# Patient Record
Sex: FEMALE | Race: WHITE
[De-identification: names, ages, dates, MRNs, and addresses within clinical notes are randomized per-mention and may not be internally consistent; named-entity substitution may affect disease eponyms.]

---

## 2017-09-29 ENCOUNTER — HOSPITAL ENCOUNTER (OUTPATIENT)
Dept: HOSPITAL 80 - BMCIMAGING | Age: 37
End: 2017-09-29
Attending: INTERNAL MEDICINE
Payer: MEDICAID

## 2017-09-29 DIAGNOSIS — T83.32XA: ICD-10-CM

## 2017-09-29 DIAGNOSIS — N83.02: ICD-10-CM

## 2017-09-29 DIAGNOSIS — N83.01: Primary | ICD-10-CM

## 2017-11-04 ENCOUNTER — HOSPITAL ENCOUNTER (EMERGENCY)
Dept: HOSPITAL 80 - FED | Age: 37
Discharge: HOME | End: 2017-11-04
Payer: MEDICAID

## 2017-11-04 VITALS
HEART RATE: 103 BPM | DIASTOLIC BLOOD PRESSURE: 76 MMHG | SYSTOLIC BLOOD PRESSURE: 130 MMHG | RESPIRATION RATE: 14 BRPM | OXYGEN SATURATION: 96 %

## 2017-11-04 VITALS — TEMPERATURE: 99 F

## 2017-11-04 DIAGNOSIS — B27.80: Primary | ICD-10-CM

## 2017-11-04 DIAGNOSIS — E86.9: ICD-10-CM

## 2017-11-04 LAB — PLATELET # BLD: 265 10^3/UL (ref 150–400)

## 2017-11-04 PROCEDURE — 3E0337Z INTRODUCTION OF ELECTROLYTIC AND WATER BALANCE SUBSTANCE INTO PERIPHERAL VEIN, PERCUTANEOUS APPROACH: ICD-10-PCS | Performed by: EMERGENCY MEDICINE

## 2017-11-04 NOTE — EDPHY
H & P


Stated Complaint: Sore throat;neg strep at [facility] care;airway patent


Time Seen by Provider: 11/04/17 18:23


HPI/ROS: 


HPI:  This is a 37-year-old female who presents with





Chief Complaint: Sore throat;neg strep at [facility];airway patent





Location:  Throat


Quality:  Sore


Duration:  2 days


Signs and Symptoms:  No fever, no cough, no neck stiffness, + right swollen 

glands, 


Timing:  Rapidly worsening


Severity:  Severe


Context:  Patient presents from the urgent care for concerns of sore throat 

that she has had for the last 2 days.  She tested negative 1st rapid strep at 

the urgent care.  She reports throat pain is constant and severe in nature, 

with difficulty swallowing decreased oral intake today.  She also complains of 

swollen right gland that is severe and constant pain.  She has a history of 

strep pharyngitis and peritonsillar abscess several years ago that required 

drainage and antibiotics.  She has taken Tylenol p.m. last night with mild 

relief of discomfort. 


Modifying Factors:  See above





Comment: 








ROS: see HPI


Constitutional: No fever, no chills, no weight loss


Eyes:  No blurred vision


Respiratory:  No shortness of breath, no cough


Cardiovascular:  No chest pain


Gastrointestinal:  No nausea, no vomiting, no diarrhea 


Genitourinary:  No dysuria 


Extremities:  No myalgias 


Neurologic:  No weakness, no numbness


Skin:  No rashes


Hematologic:  No bruising, no bleeding





MEDICAL/SURGICAL/SOCIAL HISTORY: 


Medical history:  Generally healthy.  Does not take any regular medications.


Surgical history:  Denies


Social history:  Employed








CONSTITUTIONAL: awake and alert, no obvious distress


HEENT: Atraumatic and normocephalic, PERRL, EOMI. Tympanic membranes clear. 

Oropharynx clear, tonsil on the right is 2+ mild erythema with white exudate; 

left tonsils 1+ mild erythema; white exudate; uvula midline.  Halitosis noted. 

moist pink mucosa.  Airway patent.  Right anterior cervical lymphadenopathy 

noted with tenderness to palpation.  No meningismus.


Cardiovascular: Normal S1/S2, regular rate, regular rhythm, without murmur rub 

or gallop.


PULMONARY/CHEST:  Symmetrical and nontender. Clear to auscultation bilaterally. 

Good air movement. No accessory muscle usage.


ABDOMEN:  Soft, nondistended, nontender, no rebound, no guarding, no peritoneal 

signs, no masses or organomegaly. No CVAT.


EXTREMITIES:  2/2 pulses, strength 5/5, no deformities, no clubbing, no 

cyanosis or edema.


NEUROLOGICAL: no focal neuro deficits.  GCS 15.


SKIN: Warm and dry, no erythema. no rash.  Good capillary refill. 








Source: Patient


Exam Limitations: No limitations





- Personal History


LMP (Females 10-55): 8-14 Days Ago


Current Tetanus Diphtheria and Acellular Pertussis (TDAP): Yes





- Medical/Surgical History


Hx Asthma: No


Hx Chronic Respiratory Disease: No


Hx Diabetes: No


Hx Cardiac Disease: No


Hx Renal Disease: No


Hx Cirrhosis: No


Hx Alcoholism: No


Hx HIV/AIDS: No


Hx Splenectomy or Spleen Trauma: No





- Social History


Smoking Status: Never smoked


Constitutional: 


 Initial Vital Signs











Temperature (C)  37.2 C   11/04/17 17:56


 


Heart Rate  88   11/04/17 17:56


 


Respiratory Rate  16   11/04/17 17:56


 


Blood Pressure  123/84 H  11/04/17 17:56


 


O2 Sat (%)  98   11/04/17 17:56








 











O2 Delivery Mode               Room Air














Allergies/Adverse Reactions: 


 





No Known Allergies Allergy (Unverified 11/04/17 17:56)


 








Home Medications: 














 Medication  Instructions  Recorded


 


NK [No Known Home Meds]  11/04/17














Medical Decision Making


ED Course/Re-evaluation: 


Labs, IV fluid, IV medications ordered


Patient is afebrile without systemic signs. 


Martino criteria is 2; equivocal for antibiotic use


Rapid strep was negative at the urgent care and sent for culture; will not 

repeat test. 


Patient does not have signs of airway compromise/respiratory distress/meningitis

/sepsis/tonsillar abscess/Matthew's angina


Given IVF 1 liter, IV Decadron, IV Toradol and PO viscous lidocaine


IM Bicillin given due to hx strep and tonsillar abscess. 


Mono positive


Reassessed patient who is laughing and giggling and cuddling with her 

significant other in the rod bed. 


Advised supportive care. 





Differential Diagnosis: 


Differential diagnosis includes but is not limited to strep pharyngitis, viral 

pharyngitis, lymphadenitis, mononucleosis, viral syndrome. 








- Data Points


Laboratory Results: 


 Laboratory Results





 11/04/17 18:55 





 11/04/17 18:55 





 











  11/04/17 11/04/17 11/04/17





  18:55 18:55 18:55


 


WBC      18.36 10^3/uL H 10^3/uL





     (3.80-9.50) 


 


RBC      4.95 10^6/uL 10^6/uL





     (4.18-5.33) 


 


Hgb      14.9 g/dL g/dL





     (12.6-16.3) 


 


Hct      42.4 % %





     (38.0-47.0) 


 


MCV      85.7 fL fL





     (81.5-99.8) 


 


MCH      30.1 pg pg





     (27.9-34.1) 


 


MCHC      35.1 g/dL g/dL





     (32.4-36.7) 


 


RDW      11.9 % %





     (11.5-15.2) 


 


Plt Count      265 10^3/uL 10^3/uL





     (150-400) 


 


MPV      9.4 fL fL





     (8.7-11.7) 


 


Neut % (Auto)      84.9 % H %





     (39.3-74.2) 


 


Lymph % (Auto)      7.3 % L %





     (15.0-45.0) 


 


Mono % (Auto)      6.8 % %





     (4.5-13.0) 


 


Eos % (Auto)      0.1 % L %





     (0.6-7.6) 


 


Baso % (Auto)      0.4 % %





     (0.3-1.7) 


 


Nucleat RBC Rel Count      0.0 % %





     (0.0-0.2) 


 


Absolute Neuts (auto)      15.59 10^3/uL H 10^3/uL





     (1.70-6.50) 


 


Absolute Lymphs (auto)      1.34 10^3/uL 10^3/uL





     (1.00-3.00) 


 


Absolute Monos (auto)      1.24 10^3/uL H 10^3/uL





     (0.30-0.80) 


 


Absolute Eos (auto)      0.02 10^3/uL L 10^3/uL





     (0.03-0.40) 


 


Absolute Basos (auto)      0.07 10^3/uL 10^3/uL





     (0.02-0.10) 


 


Absolute Nucleated RBC      0.00 10^3/uL 10^3/uL





     (0-0.01) 


 


Immature Gran %      0.5 % %





     (0.0-1.1) 


 


Immature Gran #      0.10 10^3/uL 10^3/uL





     (0.00-0.10) 


 


ESR      9 MM/HR MM/HR





     (0-20) 


 


Sodium    142 mEq/L mEq/L  





    (134-144)  


 


Potassium    4.0 mEq/L mEq/L  





    (3.5-5.2)  


 


Chloride    104 mEq/L mEq/L  





    ()  


 


Carbon Dioxide    26 mEq/l mEq/l  





    (22-31)  


 


Anion Gap    12 mEq/L mEq/L  





    (8-16)  


 


BUN    7 mg/dL mg/dL  





    (7-23)  


 


Creatinine    0.8 mg/dL mg/dL  





    (0.6-1.0)  


 


Estimated GFR    > 60   





    


 


Glucose    78 mg/dL mg/dL  





    ()  


 


Calcium    9.1 mg/dL mg/dL  





    (8.5-10.4)  


 


Monoscreen  POSITIVE  H     





   (NEGATIVE)   











Medications Given: 


 








Discontinued Medications





Dexamethasone (Decadron Injection)  8 mg IVP EDNOW ONE


   Stop: 11/04/17 18:41


   Last Admin: 11/04/17 19:26 Dose:  8 mg


Sodium Chloride (Ns)  1,000 mls @ 0 mls/hr IV EDNOW ONE; Wide Open


   PRN Reason: Protocol


   Stop: 11/04/17 18:41


   Last Admin: 11/04/17 19:15 Dose:  1,000 mls


Ketorolac Tromethamine (Toradol)  15 mg IVP/IM EDNOW ONE


   Stop: 11/04/17 18:41


   Last Admin: 11/04/17 19:28 Dose:  15 mg


Lidocaine (Lidocaine 2% Viscous)  15 ml PO EDNOW ONE


   Stop: 11/04/17 18:41


   Last Admin: 11/04/17 19:20 Dose:  15 ml


Penicillin G Procaine/Benzathine (Bicillin C-R 1.2mm Units Syr)  1,200,000 unit 

IM ONCE ONE


   PRN Reason: Protocol


   Stop: 11/04/17 18:41


   Last Admin: 11/04/17 19:27 Dose:  1,200,000 unit








Departure





- Departure


Disposition: Home, Routine, Self-Care


Clinical Impression: 


Infectious mononucleosis without complication


Qualifiers:


 Infectious mononucleosis etiology: other organism Qualified Code(s): B27.80 - 

Other infectious mononucleosis without complication





Condition: Good


Instructions:  Mononucleosis (ED)


Referrals: 


Chetna Steinberg MD [Primary Care Provider] - 5-7 days, if not improved

## 2017-11-04 NOTE — EDPHY
H & P


Stated Complaint: Sore throat;neg strep at [facility] care;airway patent


Time Seen by Provider: 11/04/17 18:23


HPI/ROS: 


HPI:  This is a 37-year-old female who presents with





Chief Complaint: Sore throat;neg strep at [facility];airway patent





Location:  Throat


Quality:  Sore


Duration:  2 days


Signs and Symptoms:  No fever, no cough, no neck stiffness, + right swollen 

glands, 


Timing:  Rapidly worsening


Severity:  Severe


Context:  Patient presents from the urgent care for concerns of sore throat 

that she has had for the last 2 days.  She tested negative 1st rapid strep at 

the urgent care.  She reports throat pain is constant and severe in nature, 

with difficulty swallowing decreased oral intake today.  She also complains of 

swollen right gland that is severe and constant pain.  She has a history of 

strep pharyngitis and peritonsillar abscess several years ago that required 

drainage and antibiotics.  She has taken Tylenol p.m. last night with mild 

relief of discomfort. 


Modifying Factors:  See above





Comment: 








ROS: see HPI


Constitutional: No fever, no chills, no weight loss


Eyes:  No blurred vision


Respiratory:  No shortness of breath, no cough


Cardiovascular:  No chest pain


Gastrointestinal:  No nausea, no vomiting, no diarrhea 


Genitourinary:  No dysuria 


Extremities:  No myalgias 


Neurologic:  No weakness, no numbness


Skin:  No rashes


Hematologic:  No bruising, no bleeding





MEDICAL/SURGICAL/SOCIAL HISTORY: 


Medical history:  Generally healthy.  Does not take any regular medications.


Surgical history:  Denies


Social history:  Employed








CONSTITUTIONAL: awake and alert, no obvious distress


HEENT: Atraumatic and normocephalic, PERRL, EOMI. Tympanic membranes clear. 

Oropharynx clear, tonsil on the right is 2+ mild erythema with white exudate; 

left tonsils 1+ mild erythema; white exudate; uvula midline.  Halitosis noted. 

moist pink mucosa.  Airway patent.  Right anterior cervical lymphadenopathy 

noted with tenderness to palpation.  No meningismus.


Cardiovascular: Normal S1/S2, regular rate, regular rhythm, without murmur rub 

or gallop.


PULMONARY/CHEST:  Symmetrical and nontender. Clear to auscultation bilaterally. 

Good air movement. No accessory muscle usage.


ABDOMEN:  Soft, nondistended, nontender, no rebound, no guarding, no peritoneal 

signs, no masses or organomegaly. No CVAT.


EXTREMITIES:  2/2 pulses, strength 5/5, no deformities, no clubbing, no 

cyanosis or edema.


NEUROLOGICAL: no focal neuro deficits.  GCS 15.


SKIN: Warm and dry, no erythema. no rash.  Good capillary refill. 








Source: Patient


Exam Limitations: No limitations





- Personal History


LMP (Females 10-55): 8-14 Days Ago


Current Tetanus Diphtheria and Acellular Pertussis (TDAP): Yes





- Medical/Surgical History


Hx Asthma: No


Hx Chronic Respiratory Disease: No


Hx Diabetes: No


Hx Cardiac Disease: No


Hx Renal Disease: No


Hx Cirrhosis: No


Hx Alcoholism: No


Hx HIV/AIDS: No


Hx Splenectomy or Spleen Trauma: No





- Social History


Smoking Status: Never smoked


Constitutional: 


 Initial Vital Signs











Temperature (C)  37.2 C   11/04/17 17:56


 


Heart Rate  88   11/04/17 17:56


 


Respiratory Rate  16   11/04/17 17:56


 


Blood Pressure  123/84 H  11/04/17 17:56


 


O2 Sat (%)  98   11/04/17 17:56








 











O2 Delivery Mode               Room Air














Allergies/Adverse Reactions: 


 





No Known Allergies Allergy (Unverified 11/04/17 17:56)


 








Home Medications: 














 Medication  Instructions  Recorded


 


NK [No Known Home Meds]  11/04/17














Medical Decision Making


ED Course/Re-evaluation: 


Labs, IV fluid, IV medications ordered


Patient is afebrile without systemic signs. 


Martino criteria is 2; equivocal for antibiotic use


Rapid strep was negative at the urgent care and sent for culture; will not 

repeat test. 


Patient does not have signs of airway compromise/respiratory distress/meningitis

/sepsis/tonsillar abscess/Matthew's angina


Given IVF 1 liter, IV Decadron, IV Toradol and PO viscous lidocaine


IM Bicillin given due to hx strep and tonsillar abscess. 


Mono positive


Reassessed patient who is laughing and giggling and cuddling with her 

significant other in the rod bed. 


Advised supportive care. 





Differential Diagnosis: 


Differential diagnosis includes but is not limited to strep pharyngitis, viral 

pharyngitis, lymphadenitis, mononucleosis, viral syndrome. 








- Data Points


Laboratory Results: 


 Laboratory Results





 11/04/17 18:55 





 11/04/17 18:55 





 











  11/04/17 11/04/17 11/04/17





  18:55 18:55 18:55


 


WBC      18.36 10^3/uL H 10^3/uL





     (3.80-9.50) 


 


RBC      4.95 10^6/uL 10^6/uL





     (4.18-5.33) 


 


Hgb      14.9 g/dL g/dL





     (12.6-16.3) 


 


Hct      42.4 % %





     (38.0-47.0) 


 


MCV      85.7 fL fL





     (81.5-99.8) 


 


MCH      30.1 pg pg





     (27.9-34.1) 


 


MCHC      35.1 g/dL g/dL





     (32.4-36.7) 


 


RDW      11.9 % %





     (11.5-15.2) 


 


Plt Count      265 10^3/uL 10^3/uL





     (150-400) 


 


MPV      9.4 fL fL





     (8.7-11.7) 


 


Neut % (Auto)      84.9 % H %





     (39.3-74.2) 


 


Lymph % (Auto)      7.3 % L %





     (15.0-45.0) 


 


Mono % (Auto)      6.8 % %





     (4.5-13.0) 


 


Eos % (Auto)      0.1 % L %





     (0.6-7.6) 


 


Baso % (Auto)      0.4 % %





     (0.3-1.7) 


 


Nucleat RBC Rel Count      0.0 % %





     (0.0-0.2) 


 


Absolute Neuts (auto)      15.59 10^3/uL H 10^3/uL





     (1.70-6.50) 


 


Absolute Lymphs (auto)      1.34 10^3/uL 10^3/uL





     (1.00-3.00) 


 


Absolute Monos (auto)      1.24 10^3/uL H 10^3/uL





     (0.30-0.80) 


 


Absolute Eos (auto)      0.02 10^3/uL L 10^3/uL





     (0.03-0.40) 


 


Absolute Basos (auto)      0.07 10^3/uL 10^3/uL





     (0.02-0.10) 


 


Absolute Nucleated RBC      0.00 10^3/uL 10^3/uL





     (0-0.01) 


 


Immature Gran %      0.5 % %





     (0.0-1.1) 


 


Immature Gran #      0.10 10^3/uL 10^3/uL





     (0.00-0.10) 


 


ESR      9 MM/HR MM/HR





     (0-20) 


 


Sodium    142 mEq/L mEq/L  





    (134-144)  


 


Potassium    4.0 mEq/L mEq/L  





    (3.5-5.2)  


 


Chloride    104 mEq/L mEq/L  





    ()  


 


Carbon Dioxide    26 mEq/l mEq/l  





    (22-31)  


 


Anion Gap    12 mEq/L mEq/L  





    (8-16)  


 


BUN    7 mg/dL mg/dL  





    (7-23)  


 


Creatinine    0.8 mg/dL mg/dL  





    (0.6-1.0)  


 


Estimated GFR    > 60   





    


 


Glucose    78 mg/dL mg/dL  





    ()  


 


Calcium    9.1 mg/dL mg/dL  





    (8.5-10.4)  


 


Monoscreen  POSITIVE  H     





   (NEGATIVE)   











Medications Given: 


 








Discontinued Medications





Dexamethasone (Decadron Injection)  8 mg IVP EDNOW ONE


   Stop: 11/04/17 18:41


   Last Admin: 11/04/17 19:26 Dose:  8 mg


Sodium Chloride (Ns)  1,000 mls @ 0 mls/hr IV EDNOW ONE; Wide Open


   PRN Reason: Protocol


   Stop: 11/04/17 18:41


   Last Admin: 11/04/17 19:15 Dose:  1,000 mls


Ketorolac Tromethamine (Toradol)  15 mg IVP/IM EDNOW ONE


   Stop: 11/04/17 18:41


   Last Admin: 11/04/17 19:28 Dose:  15 mg


Lidocaine (Lidocaine 2% Viscous)  15 ml PO EDNOW ONE


   Stop: 11/04/17 18:41


   Last Admin: 11/04/17 19:20 Dose:  15 ml


Penicillin G Procaine/Benzathine (Bicillin C-R 1.2mm Units Syr)  1,200,000 unit 

IM ONCE ONE


   PRN Reason: Protocol


   Stop: 11/04/17 18:41


   Last Admin: 11/04/17 19:27 Dose:  1,200,000 unit








Departure





- Departure


Disposition: Home, Routine, Self-Care


Clinical Impression: 


Infectious mononucleosis without complication


Qualifiers:


 Infectious mononucleosis etiology: other organism Qualified Code(s): B27.80 - 

Other infectious mononucleosis without complication





Condition: Good


Instructions:  Mononucleosis (ED)


Referrals: 


Chetna Steinberg MD [Primary Care Provider] - 5-7 days, if not improved

## 2017-11-04 NOTE — EDPHY
H & P


Stated Complaint: Sore throat;neg strep at [facility] care;airway patent


Time Seen by Provider: 11/04/17 18:23


HPI/ROS: 


HPI:  This is a 37-year-old female who presents with





Chief Complaint: Sore throat;neg strep at [facility];airway patent





Location:  Throat


Quality:  Sore


Duration:  2 days


Signs and Symptoms:  No fever, no cough, no neck stiffness, + right swollen 

glands, 


Timing:  Rapidly worsening


Severity:  Severe


Context:  Patient presents from the urgent care for concerns of sore throat 

that she has had for the last 2 days.  She tested negative 1st rapid strep at 

the urgent care.  She reports throat pain is constant and severe in nature, 

with difficulty swallowing decreased oral intake today.  She also complains of 

swollen right gland that is severe and constant pain.  She has a history of 

strep pharyngitis and peritonsillar abscess several years ago that required 

drainage and antibiotics.  She has taken Tylenol p.m. last night with mild 

relief of discomfort. 


Modifying Factors:  See above





Comment: 








ROS: see HPI


Constitutional: No fever, no chills, no weight loss


Eyes:  No blurred vision


Respiratory:  No shortness of breath, no cough


Cardiovascular:  No chest pain


Gastrointestinal:  No nausea, no vomiting, no diarrhea 


Genitourinary:  No dysuria 


Extremities:  No myalgias 


Neurologic:  No weakness, no numbness


Skin:  No rashes


Hematologic:  No bruising, no bleeding





MEDICAL/SURGICAL/SOCIAL HISTORY: 


Medical history:  Generally healthy.  Does not take any regular medications.


Surgical history:  Denies


Social history:  Employed








CONSTITUTIONAL: awake and alert, no obvious distress


HEENT: Atraumatic and normocephalic, PERRL, EOMI. Tympanic membranes clear. 

Oropharynx clear, tonsil on the right is 2+ mild erythema with white exudate; 

left tonsils 1+ mild erythema; white exudate; uvula midline.  Halitosis noted. 

moist pink mucosa.  Airway patent.  Right anterior cervical lymphadenopathy 

noted with tenderness to palpation.  No meningismus.


Cardiovascular: Normal S1/S2, regular rate, regular rhythm, without murmur rub 

or gallop.


PULMONARY/CHEST:  Symmetrical and nontender. Clear to auscultation bilaterally. 

Good air movement. No accessory muscle usage.


ABDOMEN:  Soft, nondistended, nontender, no rebound, no guarding, no peritoneal 

signs, no masses or organomegaly. No CVAT.


EXTREMITIES:  2/2 pulses, strength 5/5, no deformities, no clubbing, no 

cyanosis or edema.


NEUROLOGICAL: no focal neuro deficits.  GCS 15.


SKIN: Warm and dry, no erythema. no rash.  Good capillary refill. 








Source: Patient


Exam Limitations: No limitations





- Personal History


LMP (Females 10-55): 8-14 Days Ago


Current Tetanus Diphtheria and Acellular Pertussis (TDAP): Yes





- Medical/Surgical History


Hx Asthma: No


Hx Chronic Respiratory Disease: No


Hx Diabetes: No


Hx Cardiac Disease: No


Hx Renal Disease: No


Hx Cirrhosis: No


Hx Alcoholism: No


Hx HIV/AIDS: No


Hx Splenectomy or Spleen Trauma: No





- Social History


Smoking Status: Never smoked


Constitutional: 


 Initial Vital Signs











Temperature (C)  37.2 C   11/04/17 17:56


 


Heart Rate  88   11/04/17 17:56


 


Respiratory Rate  16   11/04/17 17:56


 


Blood Pressure  123/84 H  11/04/17 17:56


 


O2 Sat (%)  98   11/04/17 17:56








 











O2 Delivery Mode               Room Air














Allergies/Adverse Reactions: 


 





No Known Allergies Allergy (Unverified 11/04/17 17:56)


 








Home Medications: 














 Medication  Instructions  Recorded


 


NK [No Known Home Meds]  11/04/17














Medical Decision Making


ED Course/Re-evaluation: 


Labs, IV fluid, IV medications ordered


Patient is afebrile without systemic signs. 


Martino criteria is 2; equivocal for antibiotic use


Rapid strep was negative at the urgent care and sent for culture; will not 

repeat test. 


Patient does not have signs of airway compromise/respiratory distress/meningitis

/sepsis/tonsillar abscess/Matthew's angina


Given IVF 1 liter, IV Decadron, IV Toradol and PO viscous lidocaine


IM Bicillin given due to hx strep and tonsillar abscess. 


Mono positive


Reassessed patient who is laughing and giggling and cuddling with her 

significant other in the rod bed. 


Advised supportive care. 





Differential Diagnosis: 


Differential diagnosis includes but is not limited to strep pharyngitis, viral 

pharyngitis, lymphadenitis, mononucleosis, viral syndrome. 








- Data Points


Laboratory Results: 


 Laboratory Results





 11/04/17 18:55 





 11/04/17 18:55 





 











  11/04/17 11/04/17 11/04/17





  18:55 18:55 18:55


 


WBC      18.36 10^3/uL H 10^3/uL





     (3.80-9.50) 


 


RBC      4.95 10^6/uL 10^6/uL





     (4.18-5.33) 


 


Hgb      14.9 g/dL g/dL





     (12.6-16.3) 


 


Hct      42.4 % %





     (38.0-47.0) 


 


MCV      85.7 fL fL





     (81.5-99.8) 


 


MCH      30.1 pg pg





     (27.9-34.1) 


 


MCHC      35.1 g/dL g/dL





     (32.4-36.7) 


 


RDW      11.9 % %





     (11.5-15.2) 


 


Plt Count      265 10^3/uL 10^3/uL





     (150-400) 


 


MPV      9.4 fL fL





     (8.7-11.7) 


 


Neut % (Auto)      84.9 % H %





     (39.3-74.2) 


 


Lymph % (Auto)      7.3 % L %





     (15.0-45.0) 


 


Mono % (Auto)      6.8 % %





     (4.5-13.0) 


 


Eos % (Auto)      0.1 % L %





     (0.6-7.6) 


 


Baso % (Auto)      0.4 % %





     (0.3-1.7) 


 


Nucleat RBC Rel Count      0.0 % %





     (0.0-0.2) 


 


Absolute Neuts (auto)      15.59 10^3/uL H 10^3/uL





     (1.70-6.50) 


 


Absolute Lymphs (auto)      1.34 10^3/uL 10^3/uL





     (1.00-3.00) 


 


Absolute Monos (auto)      1.24 10^3/uL H 10^3/uL





     (0.30-0.80) 


 


Absolute Eos (auto)      0.02 10^3/uL L 10^3/uL





     (0.03-0.40) 


 


Absolute Basos (auto)      0.07 10^3/uL 10^3/uL





     (0.02-0.10) 


 


Absolute Nucleated RBC      0.00 10^3/uL 10^3/uL





     (0-0.01) 


 


Immature Gran %      0.5 % %





     (0.0-1.1) 


 


Immature Gran #      0.10 10^3/uL 10^3/uL





     (0.00-0.10) 


 


ESR      9 MM/HR MM/HR





     (0-20) 


 


Sodium    142 mEq/L mEq/L  





    (134-144)  


 


Potassium    4.0 mEq/L mEq/L  





    (3.5-5.2)  


 


Chloride    104 mEq/L mEq/L  





    ()  


 


Carbon Dioxide    26 mEq/l mEq/l  





    (22-31)  


 


Anion Gap    12 mEq/L mEq/L  





    (8-16)  


 


BUN    7 mg/dL mg/dL  





    (7-23)  


 


Creatinine    0.8 mg/dL mg/dL  





    (0.6-1.0)  


 


Estimated GFR    > 60   





    


 


Glucose    78 mg/dL mg/dL  





    ()  


 


Calcium    9.1 mg/dL mg/dL  





    (8.5-10.4)  


 


Monoscreen  POSITIVE  H     





   (NEGATIVE)   











Medications Given: 


 








Discontinued Medications





Dexamethasone (Decadron Injection)  8 mg IVP EDNOW ONE


   Stop: 11/04/17 18:41


   Last Admin: 11/04/17 19:26 Dose:  8 mg


Sodium Chloride (Ns)  1,000 mls @ 0 mls/hr IV EDNOW ONE; Wide Open


   PRN Reason: Protocol


   Stop: 11/04/17 18:41


   Last Admin: 11/04/17 19:15 Dose:  1,000 mls


Ketorolac Tromethamine (Toradol)  15 mg IVP/IM EDNOW ONE


   Stop: 11/04/17 18:41


   Last Admin: 11/04/17 19:28 Dose:  15 mg


Lidocaine (Lidocaine 2% Viscous)  15 ml PO EDNOW ONE


   Stop: 11/04/17 18:41


   Last Admin: 11/04/17 19:20 Dose:  15 ml


Penicillin G Procaine/Benzathine (Bicillin C-R 1.2mm Units Syr)  1,200,000 unit 

IM ONCE ONE


   PRN Reason: Protocol


   Stop: 11/04/17 18:41


   Last Admin: 11/04/17 19:27 Dose:  1,200,000 unit








Departure





- Departure


Disposition: Home, Routine, Self-Care


Clinical Impression: 


Infectious mononucleosis without complication


Qualifiers:


 Infectious mononucleosis etiology: other organism Qualified Code(s): B27.80 - 

Other infectious mononucleosis without complication





Condition: Good


Instructions:  Mononucleosis (ED)


Referrals: 


Chetna Steinberg MD [Primary Care Provider] - 5-7 days, if not improved

## 2018-06-27 ENCOUNTER — HOSPITAL ENCOUNTER (OUTPATIENT)
Dept: HOSPITAL 80 - BRMIMAGING | Age: 38
End: 2018-06-27
Attending: INTERNAL MEDICINE
Payer: MEDICAID

## 2018-06-27 DIAGNOSIS — M25.541: ICD-10-CM

## 2018-06-27 DIAGNOSIS — N64.4: Primary | ICD-10-CM

## 2018-06-27 DIAGNOSIS — M25.542: ICD-10-CM
